# Patient Record
Sex: FEMALE | Race: BLACK OR AFRICAN AMERICAN | NOT HISPANIC OR LATINO | Employment: FULL TIME | URBAN - METROPOLITAN AREA
[De-identification: names, ages, dates, MRNs, and addresses within clinical notes are randomized per-mention and may not be internally consistent; named-entity substitution may affect disease eponyms.]

---

## 2020-03-16 ENCOUNTER — TRANSCRIBE ORDERS (OUTPATIENT)
Dept: PHYSICAL THERAPY | Facility: CLINIC | Age: 50
End: 2020-03-16

## 2020-03-16 ENCOUNTER — TREATMENT (OUTPATIENT)
Dept: PHYSICAL THERAPY | Facility: CLINIC | Age: 50
End: 2020-03-16

## 2020-03-16 DIAGNOSIS — M43.17 SPONDYLOLISTHESIS OF LUMBOSACRAL REGION: Primary | ICD-10-CM

## 2020-03-16 DIAGNOSIS — G89.29 CHRONIC MIDLINE LOW BACK PAIN WITH LEFT-SIDED SCIATICA: Primary | ICD-10-CM

## 2020-03-16 DIAGNOSIS — M54.42 CHRONIC MIDLINE LOW BACK PAIN WITH LEFT-SIDED SCIATICA: Primary | ICD-10-CM

## 2020-03-16 PROCEDURE — 97110 THERAPEUTIC EXERCISES: CPT | Performed by: PHYSICAL THERAPIST

## 2020-03-16 PROCEDURE — 97162 PT EVAL MOD COMPLEX 30 MIN: CPT | Performed by: PHYSICAL THERAPIST

## 2020-03-16 NOTE — PROGRESS NOTES
Physical Therapy Initial Evaluation and Plan of Care      Patient: Melina Murphy   : 1970  Diagnosis/ICD-10 Code:  Chronic midline low back pain with left-sided sciatica [M54.42, G89.29]  Referring practitioner: Elliott Bernal MD    Subjective Evaluation    History of Present Illness  Mechanism of injury: Patient states she has had back pain for years but it has gotten much worse in the past year, feels like it is tied with weight gain.     Pain starts midline and radiates to both sides, down back of left leg to feet . Occasional NT.     Rads show L5 spondylolisthesis     Had baby at late age.     Worst pain is getting out of bed in morning.       Patient Occupation: [employer]  Pain  Current pain ratin  At best pain ratin  At worst pain rating: 10  Location: midline, radiates both sides and down left leg to foot. occaisonal NT.   Quality: dull ache, radiating and sharp  Relieving factors: change in position  Aggravating factors: sleeping, standing, squatting, prolonged positioning, lifting and ambulation    Patient Goals  Patient goals for therapy: decreased edema, decreased pain, improved balance, increased motion, return to sport/leisure activities, independence with ADLs/IADLs and increased strength             Objective       Postural Observations    Additional Postural Observation Details  Anterior pelvic tilt, excessive lordosis.     Palpation   Left   Hypertonic in the gluteus medius and piriformis.   Tenderness of the erector spinae, gluteus medius, lumbar paraspinals, piriformis and quadratus lumborum.     Right   Hypertonic in the gluteus medius and piriformis. Tenderness of the erector spinae, gluteus medius, lumbar paraspinals, piriformis and quadratus lumborum.     Tenderness     Lumbar Spine  Tenderness in the spinous process.     Neurological Testing     Sensation     Lumbar   Left   Intact: light touch    Right   Intact: light touch    Active Range of Motion     Lumbar    Flexion: Active lumbar flexion: minimal flexion at lumbar, all at hips. WFL  Extension: Active lumbar extension: 80% limited  with pain    Strength/Myotome Testing     Left Hip   Planes of Motion   Flexion: 4-  Extension: 3+  Abduction: 4-    Right Hip   Planes of Motion   Flexion: 4-  Extension: 3+  Abduction: 4-    Left Knee   Flexion: 4+  Extension: 4+    Right Knee   Flexion: 4+  Extension: 4+    Tests     Lumbar     Left   Positive passive lumbar instability.     Right   Positive passive lumbar instability.     Left Pelvic Girdle/Sacrum   Positive: sacrum compression, gapping and sacral spring.     Left Hip   Positive Gaenslen's.     Right Hip   Positive Gaenslen's.          Assessment & Plan     Assessment  Impairments: abnormal coordination, abnormal gait, abnormal muscle firing, abnormal muscle tone, abnormal or restricted ROM, activity intolerance, impaired balance, impaired physical strength, lacks appropriate home exercise program and pain with function  Assessment details: Patient is a 49 year old female presenting with chronic low back pain with referral to both sides and down left leg. Patient presents with anterior pelvic tilt, severely limited lumbar flexion and extension, decreased gross hip strength, hypertonicity of bilateral gluteals, and left SI joint pain. Patient is appropriate for physical therapy to address the above issues.   Prognosis: good  Prognosis details: Short Term Goals (3 weeks):  1. Patient will be independent with home exercise program.  2. Patient will demonstrate improved hip strength by 50%.  3. Patient will have no symptoms distal to knee.     Long Term Goals (8 weeks):  1. Patient will be able to walk at least 20 minutes with back pain no greater than 2/10.  2. Patient will demonstrate functional squat with back pain no greater than 2/10.  3. Patient will be able to return to full work/home duty with back pain no greater than 2/10.    Functional Limitations: lifting,  sleeping, walking, uncomfortable because of pain, moving in bed, sitting and standing  Plan  Therapy options: will be seen for skilled physical therapy services  Planned modality interventions: ultrasound, traction, thermotherapy (hydrocollator packs), TENS, high voltage pulsed current (spasm management), high voltage pulsed current (pain management), cryotherapy and electrical stimulation/Russian stimulation  Planned therapy interventions: therapeutic activities, stretching, strengthening, spinal/joint mobilization, soft tissue mobilization, postural training, neuromuscular re-education, motor coordination training, manual therapy, abdominal trunk stabilization, ADL retraining, balance/weight-bearing training, body mechanics training, fine motor coordination training, flexibility, functional ROM exercises, home exercise program, gait training, IADL retraining and joint mobilization  Frequency: 1-2x/week.  Duration in visits: 16  Treatment plan discussed with: patient        Manual Therapy:         mins  38264;  Therapeutic Exercise:    35     mins  44128;     Neuromuscular Leeann:        mins  63518;    Therapeutic Activity:          mins  69823;     Gait Training:           mins  26348;     Ultrasound:          mins  29189;    Electrical Stimulation:         mins  97244 ( );  Dry Needling          mins self-pay    Timed Treatment:   35   mins   Total Treatment:     62   mins    PT SIGNATURE: Montse Hooks, JAYDEN   DATE TREATMENT INITIATED: 3/17/2020    Initial Certification  Certification Period: 6/15/2020  I certify that the therapy services are furnished while this patient is under my care.  The services outlined above are required by this patient, and will be reviewed every 90 days.     PHYSICIAN:       DATE:     Please sign and return via fax to [phone].. Thank you, [facility].